# Patient Record
Sex: MALE | Race: ASIAN
[De-identification: names, ages, dates, MRNs, and addresses within clinical notes are randomized per-mention and may not be internally consistent; named-entity substitution may affect disease eponyms.]

---

## 2021-01-19 ENCOUNTER — APPOINTMENT (OUTPATIENT)
Dept: INTERNAL MEDICINE | Facility: CLINIC | Age: 31
End: 2021-01-19
Payer: COMMERCIAL

## 2021-01-19 ENCOUNTER — NON-APPOINTMENT (OUTPATIENT)
Age: 31
End: 2021-01-19

## 2021-01-19 VITALS
DIASTOLIC BLOOD PRESSURE: 68 MMHG | HEART RATE: 84 BPM | WEIGHT: 146.58 LBS | BODY MASS INDEX: 20.52 KG/M2 | OXYGEN SATURATION: 100 % | HEIGHT: 71 IN | SYSTOLIC BLOOD PRESSURE: 100 MMHG | TEMPERATURE: 99.3 F

## 2021-01-19 DIAGNOSIS — Z78.9 OTHER SPECIFIED HEALTH STATUS: ICD-10-CM

## 2021-01-19 DIAGNOSIS — Z00.00 ENCOUNTER FOR GENERAL ADULT MEDICAL EXAMINATION W/OUT ABNORMAL FINDINGS: ICD-10-CM

## 2021-01-19 DIAGNOSIS — Z82.49 FAMILY HISTORY OF ISCHEMIC HEART DISEASE AND OTHER DISEASES OF THE CIRCULATORY SYSTEM: ICD-10-CM

## 2021-01-19 PROCEDURE — 99072 ADDL SUPL MATRL&STAF TM PHE: CPT

## 2021-01-19 PROCEDURE — 99385 PREV VISIT NEW AGE 18-39: CPT

## 2021-01-24 PROBLEM — Z78.9 KNOWN HEALTH PROBLEMS: NONE: Status: RESOLVED | Noted: 2021-01-24 | Resolved: 2021-01-24

## 2021-01-24 PROBLEM — Z78.9 CURRENT NON-SMOKER: Status: ACTIVE | Noted: 2021-01-24

## 2021-01-24 PROBLEM — Z78.9 CURRENT NON-DRINKER OF ALCOHOL: Status: ACTIVE | Noted: 2021-01-24

## 2021-01-24 PROBLEM — Z00.00 ANNUAL PHYSICAL EXAM: Status: ACTIVE | Noted: 2021-01-24

## 2021-01-24 PROBLEM — Z82.49 FAMILY HISTORY OF MYOCARDIAL INFARCTION: Status: ACTIVE | Noted: 2021-01-24

## 2021-07-13 NOTE — HISTORY OF PRESENT ILLNESS
[FreeTextEntry1] : 30-year-old male presents for annual physical [de-identified] : Currently feels well denies any chest pain chest tightness shortness of breath problems father recently passed away from MI.  Recently had blood work done that showed low vitamin B12 and also slightly elevated LDL.  Denies any weakness numbness chest pain chest tightness denies any shortness of breath depression.

## 2021-07-13 NOTE — ASSESSMENT
[FreeTextEntry1] : Blood work reviewed, discussed Mediterranean low-fat diet diet diet and fiber for hyperlipidemia advised 1000 MCG of vitamin B12.  Return to the office annually advised annual dental care.

## 2021-07-13 NOTE — HEALTH RISK ASSESSMENT
[Good] : ~his/her~  mood as  good [No] : No [FreeTextEntry1] : health maintenance [] : No [de-identified] : none [de-identified] : none

## 2021-12-02 ENCOUNTER — EMERGENCY (EMERGENCY)
Facility: HOSPITAL | Age: 31
LOS: 1 days | Discharge: HOME | End: 2021-12-02
Admitting: STUDENT IN AN ORGANIZED HEALTH CARE EDUCATION/TRAINING PROGRAM
Payer: COMMERCIAL

## 2021-12-02 VITALS
RESPIRATION RATE: 16 BRPM | SYSTOLIC BLOOD PRESSURE: 131 MMHG | HEART RATE: 108 BPM | OXYGEN SATURATION: 100 % | TEMPERATURE: 98 F | DIASTOLIC BLOOD PRESSURE: 93 MMHG

## 2021-12-02 PROCEDURE — 99284 EMERGENCY DEPT VISIT MOD MDM: CPT

## 2021-12-02 NOTE — ED PROVIDER NOTE - NSFOLLOWUPINSTRUCTIONS_ED_ALL_ED_FT
Follow up with your primary care physician and psychiatrist in 48-72 hours.  You may also see the psychiatrist at [facility]:    [id number] [place] [address]  Phone: [phone]      SEEK IMMEDIATE MEDICAL CARE IF YOU HAVE ANY OF THE FOLLOWING SYMPTOMS: thoughts about hurting or killing yourself, thoughts about hurting or killing somebody else, hallucinations or any other worsening or persistent symptoms OR ANY NEW OR CONCERNING SYMPTOMS.

## 2021-12-02 NOTE — ED PROVIDER NOTE - CLINICAL SUMMARY MEDICAL DECISION MAKING FREE TEXT BOX
31 year old male no past medical or psych history presents for depression.  Patient with mother.  EMS states SW had no safety concerns, mother has no safety concerns and patient and mother both offered for patient to be admitted but patient and mother do not want admission.  Resources provided including [facility] and both agreeable to go tomorrow.  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  Given patient is not suicidal, not homicidal, there are no safety concerns, patient is forward thinking and patient is agreeable for outpt follow-up, patient was discharged.  Return precautions provided to both mother and patient and both agreeable.

## 2021-12-02 NOTE — ED PROVIDER NOTE - PATIENT PORTAL LINK FT
You can access the FollowMyHealth Patient Portal offered by [facility] by registering at the following website: [email or web address]. By joining [email or web address]’s FollowMyHealth portal, you will also be able to view your health information using other applications (apps) compatible with our system.

## 2021-12-02 NOTE — ED PROVIDER NOTE - OBJECTIVE STATEMENT
31 year old male no past medical or psych history presents for depression.  EMS states they were activated by a  who stated that patient should go to the hospital for depression and then  left before providing additional information.  Patient adamantly denies any SI.  States that since the death of his father 1 year ago he has been depressed, and sometimes feels anxious.  Patient states that he also moved to a new neighborhood so he does not know where to go so he stays home for 3 hours at a time.  Patient states that he does see a psychiatrist but is not on any meds.  Patient accompanied by mother Zee Richards who states the same as patient, and has no safety concerns for patient. 31 year old male no past medical or psych history presents for depression.  EMS states they were activated by a  who stated that patient should go to the hospital for depression and then  left before providing additional information.  Patient adamantly denies any SI.  States that since the death of his father 1 year ago he has been depressed, and sometimes feels anxious.  Patient states that he also moved to a new neighborhood so he does not know where to go so he stays home for 3 hours at a time.  Patient states that he does see a psychiatrist but is not on any meds.  Patient accompanied by mother Zee Richards who states the same as patient, and has no safety concerns for patient.  Patient denies HI/AH/VH.  Patient denies illicit drugs or ETOH, no physical complaints or concerns.  EMS states they are unaware of any safety concerns from .

## 2021-12-02 NOTE — ED ADULT TRIAGE NOTE - CHIEF COMPLAINT QUOTE
brought in ems from home after  called due to concern for anxiety and depression. ptdenies si, hi, hallucinations. states depression has been worse since father passed last year. hx adhd. not currently on any medication besides herbal.

## 2021-12-02 NOTE — ED PROVIDER NOTE - NSICDXNOPASTMEDICALHX_GEN_ALL_ED
Routing to Dr. Godinez office, patient's new PCP since Dr. Schmitz's practice has ended.    Last OV with Dr. Godinez 11/17/2016        <-- Click to add NO pertinent Past Medical History
